# Patient Record
Sex: FEMALE | Race: WHITE
[De-identification: names, ages, dates, MRNs, and addresses within clinical notes are randomized per-mention and may not be internally consistent; named-entity substitution may affect disease eponyms.]

---

## 2017-09-14 NOTE — EDM.PDOC
ED HPI GENERAL MEDICAL PROBLEM





- General


Chief Complaint: Respiratory Problem


Stated Complaint: SORE THROAT


Time Seen by Provider: 09/14/17 18:27


Source of Information: Reports: Patient


History Limitations: Reports: No Limitations





- History of Present Illness


INITIAL COMMENTS - FREE TEXT/NARRATIVE: 





PT STATES SHE DEVELOPED COUGH WITH YELLOW SPUTUM AND CHEST CONGESTION 2 DAYS 

AGO AND BECOMING WORSE. FEELS LIKE SHE HAS HAD FEVER ON/OFF. DENIES PREGNANCY, N

/V, SOB, CP.


Onset: Gradual


Onset Date: 09/12/17


Severity: Mild


Associated Symptoms: Reports: Cough, Fever/Chills





- Related Data


 Allergies











Allergy/AdvReac Type Severity Reaction Status Date / Time


 


amoxicillin Allergy  Rash Verified 09/14/17 18:54


 


hydrocodone Allergy  Rash Verified 09/14/17 18:54


 


Sulfa (Sulfonamide Allergy  Cannot Verified 09/14/17 18:54





Antibiotics)   Remember  











Home Meds: 


 Home Meds





Albuterol [Proventil HFA] 6.7 gm INH Q4H #1 inhaler 09/14/17 [Rx]


Azithromycin [Zithromax] 500 mg PO DAILY #6 tablet 09/14/17 [Rx]











ED ROS GENERAL





- Review of Systems


Review Of Systems: ROS reveals no pertinent complaints other than HPI.


Constitutional: Reports: Fever


HEENT: Reports: Throat Pain


Respiratory: Reports: Wheezing, Cough, Sputum


Cardiovascular: Reports: No Symptoms


Endocrine: Reports: No Symptoms


GI/Abdominal: Reports: No Symptoms


: Reports: No Symptoms


Musculoskeletal: Reports: No Symptoms


Skin: Reports: No Symptoms


Neurological: Reports: No Symptoms


Psychiatric: Reports: No Symptoms


Hematologic/Lymphatic: Reports: No Symptoms


Immunologic: Reports: No Symptoms





ED EXAM, GENERAL





- Physical Exam


Exam: See Below


Exam Limited By: No Limitations


General Appearance: Alert, WD/WN, No Apparent Distress


Eye Exam: Bilateral Eye: Normal Inspection


Ears: Normal External Exam, Normal Canal, Normal TMs


Nose: Normal Inspection, Normal Mucosa, No Blood


Throat/Mouth: Normal Inspection, Normal Oropharynx, No Airway Compromise


Head: Atraumatic, Normocephalic


Neck: Normal Inspection, Supple.  No: Lymphadenopathy (L), Lymphadenopathy (R)


Respiratory/Chest: No Respiratory Distress, Rhonchi (APICALLY / CLEARS WITH 

COUGH)


Cardiovascular: Regular Rate, Rhythm, No Murmur


GI/Abdominal: Normal Bowel Sounds, Soft, Non-Tender


Extremities: Normal Inspection, No Pedal Edema


Neurological: Alert, Oriented, Normal Cognition


Psychiatric: Normal Affect, Normal Mood


Skin Exam: Warm, Dry, Intact, Normal Color, No Rash


Lymphatic: No Adenopathy





Course





- Re-Assessments/Exams


Free Text/Narrative Re-Assessment/Exam: 





09/14/17 18:48


PT AFEBRILE, NONTOXIC APPEARING, VSS, ROCEPHIN AND ALB/ATRO NEB GIVEN





Departure





- Departure


Time of Disposition: 18:49


Disposition: Home, Self-Care 01


Condition: Good


Clinical Impression: 


 Bronchitis








- Discharge Information


Instructions:  Acute Bronchitis, Easy-to-Read


Referrals: 


PCP,Not In Area [Primary Care Provider] - 


Forms:  ED Department Discharge


Additional Instructions: 


FOLLOW UP WITH PCP IN NEXT 2-3 DAYS. RETURN TO ER SOONER IF SYMPTOMS CONTINUE





- Assessment/Plan


Assessment:: 





BRONCHITIS


Plan: 





FOLLOW UP AT CLINIC IN NEXT 2-3 DAYS

## 2018-03-10 NOTE — EDM.PDOC
ED HPI GENERAL MEDICAL PROBLEM





- General


Chief Complaint: General


Stated Complaint: HEARTBURN,CHEST PAIN


Time Seen by Provider: 03/10/18 12:21


Source of Information: Reports: Patient


History Limitations: Reports: No Limitations





- History of Present Illness


INITIAL COMMENTS - FREE TEXT/NARRATIVE: 





Patient is a 37-year-old female who presents to the emergency department this 

afternoon with a complaint of acid reflux and chest pain.  Patient states it's 

been going on for several weeks and she is been taking Tums for mild relief.  

Patient states that this is worse when she is lying flat and after eating.  

Patient denies pain associated with activity, fever, family history of early 

cardiac issues, nausea, vomiting, abdominal pain, blood in stool, or 

possibility of pregnancy.


Onset: Gradual


Duration: Week(s):


Location: Reports: Chest, Abdomen


Quality: Reports: Burning


Severity: Mild


Improves with: Reports: Medication


Worsens with: Reports: Eating, Other (Lying flat)


Context: Denies: Activity, Exercise, Lifting, Sick Contact, Trauma


Associated Symptoms: Reports: No Other Symptoms


Treatments PTA: Reports: Other Medication(s) (tums)





- Related Data


 Allergies











Allergy/AdvReac Type Severity Reaction Status Date / Time


 


amoxicillin Allergy  Rash Verified 03/10/18 12:32


 


hydrocodone Allergy  Rash Verified 03/10/18 12:32


 


Sulfa (Sulfonamide Allergy  Cannot Verified 03/10/18 12:32





Antibiotics)   Remember  











Home Meds: 


 Home Meds





Albuterol [Proventil HFA] 6.7 gm INH Q4H #1 inhaler 09/14/17 [Rx]


Ranitidine HCl [Zantac] 150 mg PO DAILY #30 tablet 03/10/18 [Rx]











Social & Family History





- Tobacco Use


Smoking Status *Q: Current Every Day Smoker


Years of Tobacco use: 25


Packs/Tins Daily: 0.5


Second Hand Smoke Exposure: Yes





- Recreational Drug Use


Recreational Drug Use: No





ED ROS GENERAL





- Review of Systems


Review Of Systems: ROS reveals no pertinent complaints other than HPI.


Constitutional: Reports: No Symptoms


HEENT: Reports: No Symptoms, Other (Burning sensation in throat after eating, 

and while lying flat)


Respiratory: Reports: No Symptoms


Cardiovascular: Reports: No Symptoms.  Denies: Dyspnea on Exertion, Palpitations

, Syncope


Endocrine: Reports: No Symptoms


GI/Abdominal: Denies: Abdominal Pain (Epigastric at times), Black Stool, Bloody 

Stool, Nausea, Vomiting


: Reports: No Symptoms


Musculoskeletal: Reports: No Symptoms


Skin: Reports: No Symptoms


Neurological: Reports: No Symptoms


Psychiatric: Reports: No Symptoms


Hematologic/Lymphatic: Reports: No Symptoms


Immunologic: Reports: No Symptoms





ED EXAM, GI/ABD





- Physical Exam


Exam: See Below


Exam Limited By: No Limitations


General Appearance: Alert, WD/WN, No Apparent Distress


Nose: Normal Inspection, Normal Mucosa, No Blood


Throat/Mouth: Normal Inspection, Normal Oropharynx, No Airway Compromise


Head: Atraumatic, Normocephalic


Neck: Normal Inspection, Supple


Respiratory/Chest: No Respiratory Distress, Lungs Clear, Normal Breath Sounds, 

Chest Non-Tender


Cardiovascular: Regular Rate, Rhythm, No Murmur


GI/Abdominal Exam: Normal Bowel Sounds, Soft, Non-Tender, No Organomegaly, No 

Distention, No Abnormal Bruit, No Mass


Back Exam: Normal Inspection.  No: CVA Tenderness (L), CVA Tenderness (R)


Extremities: Normal Inspection, No Pedal Edema


Neurological: Alert, Oriented, Normal Cognition


Psychiatric: Normal Affect, Normal Mood


Skin Exam: Warm, Dry, Intact, Normal Color, No Rash


Lymphatic: No Adenopathy





EKG INTERPRETATION


EKG Date: 03/10/18


Time: 12:05


Rhythm: NSR


Rate (Beats/Min): 70


Axis: Normal


P-Wave: Present


QRS: Normal


ST-T: Normal


QT: Normal


Comparison: NA - No Prior EKG





Course





- Orders/Labs/Meds


Orders: 





 Active Orders 24 hr











 Category Date Time Status


 


 EKG Documentation Completion [RC] ASDIRECTED Care  03/10/18 12:22 Ordered


 


 GI Cocktail Med  03/10/18 12:21 Once





 45 ml PO ONETIME ONE   


 


 EKG 12 Lead [EK] Routine Ther  03/10/18 12:21 Ordered














- Re-Assessments/Exams


Free Text/Narrative Re-Assessment/Exam: 





03/10/18 12:30


Patient afebrile, nontoxic appearing, vital signs stable.  Patient given GI 

cocktail and symptoms subsided.  Patient will be given Zantac 150 over the 

counter instructions and follow-up with PCP in 2-3 days.





Departure





- Departure


Time of Disposition: 12:32


Disposition: Home, Self-Care 01


Condition: Good


Clinical Impression: 


GERD (gastroesophageal reflux disease)


Qualifiers:


 Esophagitis presence: esophagitis presence not specified Qualified Code(s): 

K21.9 - Gastro-esophageal reflux disease without esophagitis








- Discharge Information


Instructions:  Food Choices for Gastroesophageal Reflux Disease, Adult, Easy-to-

Read, Gastroesophageal Reflux Disease, Adult, Easy-to-Read


Referrals: 


Denia Cary MD [Primary Care Provider] - 


Forms:  ED Department Discharge


Additional Instructions: 


Follow-up with Dr. Pina in next 2-3 days.  Return to the emergency 

department sooner if symptoms continue or worsen.





- My Orders


Last 24 Hours: 





My Active Orders





03/10/18 12:21


GI Cocktail   45 ml PO ONETIME ONE 


EKG 12 Lead [EK] Routine 





03/10/18 12:22


EKG Documentation Completion [RC] ASDIRECTED 














- Assessment/Plan


Last 24 Hours: 





My Active Orders





03/10/18 12:21


GI Cocktail   45 ml PO ONETIME ONE 


EKG 12 Lead [EK] Routine 





03/10/18 12:22


EKG Documentation Completion [RC] ASDIRECTED 











Assessment:: 





GERD


Plan: 





Follow-up with PCP in 2-3 days

## 2018-05-15 ENCOUNTER — HOSPITAL ENCOUNTER (OUTPATIENT)
Dept: HOSPITAL 77 - KA.SDS | Age: 38
Discharge: HOME | End: 2018-05-15
Payer: MEDICAID

## 2018-05-15 VITALS — DIASTOLIC BLOOD PRESSURE: 65 MMHG | SYSTOLIC BLOOD PRESSURE: 119 MMHG

## 2018-05-15 DIAGNOSIS — K29.70: Primary | ICD-10-CM

## 2018-05-15 DIAGNOSIS — F17.200: ICD-10-CM

## 2018-05-15 DIAGNOSIS — Z88.2: ICD-10-CM

## 2018-05-15 DIAGNOSIS — Z98.51: ICD-10-CM

## 2018-05-15 DIAGNOSIS — Z88.5: ICD-10-CM

## 2018-05-15 DIAGNOSIS — R07.9: ICD-10-CM

## 2018-05-15 DIAGNOSIS — Z98.890: ICD-10-CM

## 2018-05-15 DIAGNOSIS — Z88.1: ICD-10-CM

## 2018-05-15 DIAGNOSIS — K21.0: ICD-10-CM

## 2018-05-15 NOTE — PROC
PROVIDER:  Grzegorz Ray MD

 

REFERRING PHYSICIAN:  GUERRERO Gastelum.

 

PRE-PROCEDURE DIAGNOSIS:  Gastroesophageal reflux disease.

 

POST-PROCEDURE DIAGNOSIS:  Gastritis.

 

PROCEDURE PERFORMED:  Esophagogastroduodenoscopy with biopsy.

 

INDICATIONS FOR SURGERY:  This 38-year-old female has been having symptoms of

burning pain in her chest and upper abdomen.  This has not been well controlled

with PPI agents, and she is referred for an upper endoscopy.

 

FINDINGS:  The patient's esophagus does not appear to be acutely inflamed or

otherwise abnormal.  In her stomach, there is a mild degree of inflammation in

the antrum near the pylorus, but no ulcers are seen.  Her duodenum and the

remainder of her stomach appeared normal.

 

PROCEDURE:  The patient was taken to the operating room.  She was given

intravenous sedation, and the esophagus was intubated under direct visualization

with the Olympus gastroscope.  This was carefully advanced under direct

visualization through the esophagus, stomach, and into the duodenum, where

examination to the fourth portion was performed.  After carefully examining the

duodenum, the scope was withdrawn back into the stomach where full examination

including a retroflexed examination of the fundus was carried out.  Random

biopsies of the antrum were taken to rule out H. pylori.  The GE junction was

then carefully examined.  This appeared relatively normal and did not show gross

evidence of severe inflammation, but biopsies of the GE junction were taken

because of the patient's symptoms.  The scope was then withdrawn and removed.

The patient was then taken from the operating room in a satisfactory condition.

 

ESTIMATED BLOOD LOSS:  3 mL.

 

COMPLICATIONS:  None.

 

PROGNOSIS:  Good.

 

DD:  05/15/2018 11:47:24

DT:  05/15/2018 22:05:43

Job #:  [id number]

## 2018-05-15 NOTE — PCM.PN
- General Info


Date of Service: 05/15/18





- Review of Systems


Systems Review Comment:: 


38-year-old female referred for upper endoscopy. She has a several year history 

of upper abdominal and chest pain. She describes this as a burning acid reflux 

type pain. She also states that acid sometimes comes up into her mouth. She has 

tried multiple medications in the past. Currently she is taking a generic form 

of omeprazole. I have reviewed the proposed upper endoscopy with the patient. 

She agrees to proceed accepting risks.








- Patient Data


Vitals - Most Recent: 


 Last Vital Signs











Temp  97.6 F   05/15/18 09:13


 


Pulse  70   05/15/18 09:13


 


Resp  16   05/15/18 09:13


 


BP  136/72   05/15/18 09:13


 


Pulse Ox  98   05/15/18 09:13











Weight - Most Recent: 78.925 kg


Med Orders - Current: 


 Current Medications





Lactated Ringer's (Ringers, Lactated)  1,000 mls @ 50 mls/hr IV ASDIRECTED ARACELY


   Last Admin: 05/15/18 09:13 Dose:  50 mls/hr


Sodium Chloride (Syrex Flush)  5 ml FLUSH Q8HR PRN


   PRN Reason: Keep Vein Open





Discontinued Medications





Fentanyl (Sublimaze) Confirm Administered Dose 100 mcg .ROUTE .STK-MED ONE


   Stop: 05/15/18 08:07


Midazolam HCl (Versed 1 Mg/Ml) Confirm Administered Dose 4 mg .ROUTE .STK-MED 

ONE


   Stop: 05/15/18 08:07


Propofol (Diprivan  20 Ml) Confirm Administered Dose 200 mg .ROUTE .STK-MED ONE


   Stop: 05/15/18 08:07











- Problem List Review


Problem List Initiated/Reviewed/Updated: Yes





- My Orders


Last 24 Hours: 


My Active Orders





05/15/18 09:00


Peripheral IV Care [RC] .AS DIRECTED 


HCG QUALITATIVE,URINE [URCHEM] Routine 


Lactated Ringers [Ringers, Lactated] 1,000 ml IV ASDIRECTED 


Sodium Chloride 0.9% [Syrex Flush]   5 ml FLUSH Q8HR PRN 


Peripheral IV Insertion Adult [OM.PC] Routine 





05/15/18 09:30


Patient to Empty Bladder [RC] ASDIRECTED 





05/15/18 09:45


Verify Patient Consent Obtain [RC] ASDIRECTED 





05/15/18 Breakfast


Nothing Per Oral Diet [DIET] 














- Assessment


Assessment:: 





Acid reflux symptoms





- Plan


Plan:: 





Upper endoscopy

## 2018-05-15 NOTE — PCM.OPNOTE
- General Post-Op/Procedure Note


Date of Surgery/Procedure: 05/15/18


Operative Procedure(s): EGD with biopsy


Findings: 





Mild Gastritis in lower stomach


Pre Op Diagnosis: GERD


Post-Op Diagnosis: Gastritis


Anesthesia Technique: MAC


Primary Surgeon: Grzegorz Ray


Pathology: 





Biopsies of Antrum and GE Jct


Output, Urine Amount: 0


EBL in mLs: 3


Complications: None


Condition: Good

## 2020-08-24 NOTE — CR
______________________________________________________________________________   

  

[id number] RAD/RAD Chest PA And Lateral  

EXAM: FRONTAL AND LATERAL CHEST  

   

 INDICATION: RIGHT UPPER EPIGASTRIC PAIN.  

   

 COMPARISON: None.  

   

 DISCUSSION: The lungs are borderline hyperinflated, but clear. The heart is  

 normal in size.  

   

 IMPRESSION:    

 1.  Negative exam.  

   

 Electronically signed by Julian Vivar MD on 8/24/2020 9:22 AM  

   

  

Julian Vivar MD                 

 08/24/20 0989    

  

Thank you for allowing us to participate in the care of your patient.

## 2020-08-24 NOTE — EDM.PDOC
ED HPI GENERAL MEDICAL PROBLEM





- General


Chief Complaint: General


Stated Complaint: VERY HOT, DIZZY


Time Seen by Provider: 08/24/20 18:47


Source of Information: Reports: Patient





- History of Present Illness


INITIAL COMMENTS - FREE TEXT/NARRATIVE: 





Was seen in the emergency department this morning with a complete cardiac work-

up as well as electrolytes showing a concentration component likely dehydration.


Received 1 L of fluid felt better and was discharged home with instructions for 

follow-up this coming Thursday, 27 August 2020.





Rested at home sipping fluids drinking water, Gatorade, and also chicken noodle 

soup.


This evening experienced another episode of sweating with dizziness.  She drove 

herself to the emergency department for evaluation and has had 2 or 3 smaller 

episodes since then.





When discussing components and her menstrual irregularities over the past 6 

years since a endometrial scraping, she states "I was sweating in places I did 

not know I had".








Onset: Today





- Related Data


                                    Allergies











Allergy/AdvReac Type Severity Reaction Status Date / Time


 


amoxicillin Allergy  Rash Verified 08/24/20 18:31


 


hydrocodone Allergy  Rash Verified 08/24/20 18:31


 


Sulfa (Sulfonamide Allergy  Cannot Verified 08/24/20 18:31





Antibiotics)   Remember  











Home Meds: 


                                    Home Meds





Acetaminophen/Diphenhydramine [Tylenol Pm Ex-Strength Caplet] 1 each PO BEDTIME 

08/24/20 [History]


Doxylamine Succinate [Unisom Sleep Aid] 25 mg PO BEDTIME PRN 08/24/20 [History]











Past Medical History


HEENT History: Reports: Impaired Vision


Cardiovascular History: Reports: High Cholesterol


Respiratory History: Reports: Bronchitis, Recurrent


Gastrointestinal History: Reports: Bowel Obstruction, Other (See Below)


Other Gastrointestinal History: Heartburn.


OB/GYN History: Reports: Endometrial Ablation, Pregnancy, Other (See Below)


Other OB/GYN History: Tubes lasered.


Endocrine/Metabolic History: Reports: None


Immunologic History: Reports: None


Oncologic (Cancer) History: Reports: Cervix





- Infectious Disease History


Infectious Disease History: Reports: Chicken Pox





- Past Surgical History


HEENT Surgical History: Reports: Adenoidectomy, Tonsillectomy


GI Surgical History: Reports: Appendectomy, Colon, Colonoscopy


Musculoskeletal Surgical History: Reports: Shoulder Surgery





- Past Imaging History


Past Imaging History: Reports: Ultrasound, Xray





Social & Family History





- Family History


Family Medical History: Noncontributory





- Tobacco Use


Smoking Status *Q: Current Every Day Smoker


Tobacco Use Within Last Twelve Months: Cigarettes


Packs/Tins Daily: 0.5


Smoking Cessation Information Provided To Patient: No





- Caffeine Use


Caffeine Use: Reports: Coffee, Soda





- Alcohol Use


Alcohol Use History: Yes


Days Per Week of Alcohol Use: 2


Alcohol Use in Last Twelve Months: Yes


Alcohol Use Frequency: Socially





ED ROS GENERAL





- Review of Systems


Review Of Systems: Comprehensive ROS is negative, except as noted in HPI.





ED EXAM, GENERAL





- Physical Exam


Exam: See Below


Free Text/Narrative:: 





Alert oriented x3 in no acute distress.


HEENT is negative discharge or deformity.


PERRLA no icterus no injection extraocular motion is intact.


There is no involvement of the auditory canals or tympanic membranes.


Pink moist mucous membranes with no erythema nor exudate.


Neck is soft supple no lymphadenopathy, no nuchal rigidity, no carotid bruit 

auscultated.


Thorax is clear throughout with no wheezes nor crackles noted.


Cardiac is S1-S2 I do not appreciate any murmur.


Abdomen is soft bowel sounds are present no tenderness is noted, no 

hepatosplenomegaly.


 rectal is deferred


She moves her extremities about with no discomfort radial pulse correlates with 

apical heart rate.


There is no edema to the lower extremities.





She was initially using her phone as I entered the room has remained totally anders

ropriate and is uncertain when questioned about hormonal perimenopausal stating 

she has not had regular menstrual cycle for 6 years since she had "endometrial 

scraping of her uterus."


General Appearance: Alert, WD/WN





Course





- Vital Signs


Last Recorded V/S: 


                                Last Vital Signs











Temp  36.8 C   08/24/20 18:32


 


Pulse  77   08/24/20 19:17


 


Resp  20   08/24/20 19:17


 


BP  153/96 H  08/24/20 19:17


 


Pulse Ox  96   08/24/20 19:17














- Orders/Labs/Meds


Orders: 


                               Active Orders 24 hr











 Category Date Time Status


 


 Peripheral IV Care [RC] .AS DIRECTED Care  08/24/20 18:39 Active


 


 Sodium Chloride 0.9% [Saline Flush] Med  08/24/20 18:39 Active





 10 ml FLUSH Q8HR PRN   


 


 Peripheral IV Insertion Adult [OM.PC] Routine Oth  08/24/20 18:39 Ordered








                                Medication Orders





Sodium Chloride (Saline Flush)  10 ml FLUSH Q8HR PRN


   PRN Reason: keep vein open


   Last Admin: 08/24/20 18:52  Dose: 10 ml


   Documented by: JOSÉ MIGUEL








Labs: 


                                Laboratory Tests











  08/24/20 08/24/20 08/24/20 Range/Units





  19:40 19:40 19:45 


 


WBC   10.79 H   (5.00-10.00)  10^3/uL


 


RBC   4.54   (3.80-5.50)  10^6/uL


 


Hgb   14.9  D   (12.0-16.0)  g/dL


 


Hct   43.6   (37.0-47.0)  %


 


MCV   96.0 H   (82.0-92.0)  fL


 


MCH   32.8 H   (27.0-31.0)  pg


 


MCHC   34.2   (32.0-36.0)  g/dL


 


RDW   13.2   (11.5-14.5)  %


 


Plt Count   189   (150-400)  10^3/uL


 


MPV   10.0   (7.4-10.4)  fL


 


Immature Gran % (Auto)   0.1   (0.0-5.0)  %


 


Neut % (Auto)   62.0   (50.0-70.0)  %


 


Lymph % (Auto)   30.2   (20.0-40.0)  %


 


Mono % (Auto)   6.9   (2.0-8.0)  %


 


Eos % (Auto)   0.4 L   (1.0-3.0)  %


 


Baso % (Auto)   0.4   (0.0-1.0)  %


 


Neut # (Auto)   6.70   (2.50-7.00)  10^3/uL


 


Lymph # (Auto)   3.26   (1.00-4.00)  10^3/uL


 


Mono # (Auto)   0.74   (0.10-0.80)  10^3/uL


 


Eos # (Auto)   0.04 L   (0.10-0.30)  10^3/uL


 


Baso # (Auto)   0.04   (0.00-0.10)  10^3/uL


 


Immature Gran # (Auto)   0.01   (0.00-0.50)  10^3/uL


 


Troponin I  0.07    (0.00-0.070)  ng/mL


 


TSH, Ultra Sensitive  2.720    (0.340-4.820)  uIU/mL


 


Specimen Type    Urincc  


 


Urine Color    Light yellow  (YELLOW)  


 


Urine Appearance    Clear  (CLEAR)  


 


Urine pH    7.0  (5.0-9.0)  


 


Ur Specific Gravity    1.015  (1.005-1.030)  


 


Urine Protein    Negative  (NEGATIVE)  mg/dL


 


Urine Glucose (UA)    Negative  (NEGATIVE)  mg/dL


 


Urine Ketones    Negative  (NEGATIVE)  mg/dL


 


Urine Occult Blood    Negative  (NEGATIVE)  


 


Urine Nitrite    Negative  (NEGATIVE)  


 


Urine Bilirubin    Negative  (NEGATIVE)  


 


Urine Urobilinogen    0.2  (0.2-1.0)  E.U./dL


 


Ur Leukocyte Esterase    Negative  (NEGATIVE)  











Meds: 


Medications











Generic Name Dose Route Start Last Admin





  Trade Name Freq  PRN Reason Stop Dose Admin


 


Sodium Chloride  10 ml  08/24/20 18:39  08/24/20 18:52





  Saline Flush  FLUSH   10 ml





  Q8HR PRN   Administration





  keep vein open  














Discontinued Medications














Generic Name Dose Route Start Last Admin





  Trade Name Freq  PRN Reason Stop Dose Admin


 


Sodium Chloride  1,000 mls @ 999 mls/hr  08/24/20 18:39  08/24/20 18:47





  Normal Saline  IV  08/24/20 19:39  999 mls/hr





  .BOLUS ONE   Administration


 


Lorazepam  0.5 mg  08/24/20 20:44 





  Ativan  PO  08/24/20 20:45 





  ONETIME ONE  














Departure





- Departure


Time of Disposition: 20:50


Disposition: Home, Self-Care 01


Condition: Good


Clinical Impression: 


 Hot flashes, Dizziness and giddiness, [place]landa-menopause








- Discharge Information


*PRESCRIPTION DRUG MONITORING PROGRAM REVIEWED*: Not Applicable


*COPY OF PRESCRIPTION DRUG MONITORING REPORT IN PATIENT RODRIGO: Not Applicable


Referrals: 


Evelia Devi PA-C [Primary Care Provider] - 


Forms:  ED Department Discharge


Additional Instructions: 


I BELIEVE YOU ARE EXPERIENCING "HOT FLASHES"   Perimenopausal. 





Your labs are all negative or showing improvement from this am.  The extra fluid

you have received by IV or by drinking will make up for the sweating/hor 

flashes.





Call the clinic in the am to see Evelia as soon as possible to complete hormonal 

testing and possible treatment options.





Sepsis Event Note (ED)





- Evaluation


Sepsis Screening Result: No Definite Risk





- Focused Exam


Vital Signs: 


                                   Vital Signs











  Temp Pulse Resp BP Pulse Ox


 


 08/24/20 19:17   77  20  153/96 H  96


 


 08/24/20 18:54   84  18  130/76  97


 


 08/24/20 18:32  36.8 C  88  18  151/95 H  97














- Problem List & Annotations


(1) Hot flashes


SNOMED Code(s): 690963620


   Code(s): R23.2 - FLUSHING   Status: Acute   Priority: Medium   Current Visit:

Yes   





(2) Dizziness and giddiness


SNOMED Code(s): 483070399


   Code(s): R42 - DIZZINESS AND GIDDINESS   Status: Acute   Priority: Medium   

Current Visit: Yes   





(3) [place]a-menopause


SNOMED Code(s): 116171993534739


   Code(s): N95.1 - MENOPAUSAL AND FEMALE CLIMACTERIC STATES   Status: Acute   

Current Visit: Yes   





- Problem List Review


Problem List Initiated/Reviewed/Updated: Yes





- My Orders


Last 24 Hours: 


My Active Orders





08/24/20 18:39


Peripheral IV Care [RC] .AS DIRECTED 


Sodium Chloride 0.9% [Saline Flush]   10 ml FLUSH Q8HR PRN 


Peripheral IV Insertion Adult [OM.PC] Routine 














- Assessment/Plan


Last 24 Hours: 


My Active Orders





08/24/20 18:39


Peripheral IV Care [RC] .AS DIRECTED 


Sodium Chloride 0.9% [Saline Flush]   10 ml FLUSH Q8HR PRN 


Peripheral IV Insertion Adult [OM.PC] Routine 











Plan: 





I BELIEVE YOU ARE EXPERIENCING "HOT FLASHES"   Perimenopausal. 





Your labs are all negative or showing improvement from this am.  The extra fluid

 you have received by IV or by drinking will make up for the sweating/hor 

flashes.





Call the clinic in the am to see Evelia as soon as possible to complete hormonal 

testing and possible treatment options.

## 2020-08-24 NOTE — EDM.PDOC
ED HPI GENERAL MEDICAL PROBLEM





- General


Chief Complaint: General


Stated Complaint: dizzy, light headed, shakey


Time Seen by Provider: 08/24/20 08:57


Source of Information: Reports: Patient


History Limitations: Reports: No Limitations





- History of Present Illness


INITIAL COMMENTS - FREE TEXT/NARRATIVE: 





41 YO WF PRESENTS TO ER COMPLAINING OF DIZZINESS AND NEAR SYNCOPE WHICH OCCURRED

PRIOR TO ARRIVAL. PT REPORTS SHE WAS AT WORK AND BECAME DIZZY. PT REPORTS 

SITTING DOWN AND CONTINUED TO FEEL WEAK BUT DENIES LOSS OF CONSCIOUSNESS. PT 

REPORTS ASSOCIATED DIAPHORESIS AND NAUSEA. PT DENIES CHEST PAIN OR SHORTNESS OF 

BREATH. PT REPORTS SHE HAD COFFEE, EXCEDRIN AND SODA POP BUT NOTHING TO EAT THIS

AM. PT REPORTS SHE'S HAD EPISODES OF THIS IN THE PAST WITH ASSOCIATED ARM 

NUMBNESS WHICH HAS BEEN TRANSIENT IN NATURE. PT DENIES FEVER/CHILLS, NO 

COUGH/CONGESTION AND NO SICK CONTACTS. 


Onset: Today


Duration: Improving, Resolved Prior to Arrival


Location: Reports: Generalized


Severity: Mild


Improves with: Reports: Rest


Worsens with: Reports: None


Associated Symptoms: Reports: Diaphoresis, Loss of Appetite, Nausea/Vomiting.  

Denies: Confusion, Chest Pain, Cough, cough w sputum, Fever/Chills, Headaches, S

hortness of Breath


  ** Right Epigastric


Pain Score (Numeric/FACES): 6





- Related Data


                                    Allergies











Allergy/AdvReac Type Severity Reaction Status Date / Time


 


amoxicillin Allergy  Rash Verified 05/15/18 08:57


 


hydrocodone Allergy  Rash Verified 05/15/18 08:57


 


Sulfa (Sulfonamide Allergy  Cannot Verified 05/15/18 08:57





Antibiotics)   Remember  











Home Meds: 


                                    Home Meds





RX: Omeprazole 40 mg PO BIDAC 05/09/18 [History]


RX: atorvaSTATin [Lipitor] 5 mg PO DAILY 05/15/18 [History]


RX: traMADol [Ultram] 50 mg PO DAILY 05/15/18 [History]











Past Medical History


HEENT History: Reports: Impaired Vision


Cardiovascular History: Reports: High Cholesterol


Respiratory History: Reports: Bronchitis, Recurrent


Gastrointestinal History: Reports: Bowel Obstruction, Other (See Below)


Other Gastrointestinal History: Heartburn.


OB/GYN History: Reports: Endometrial Ablation, Pregnancy, Other (See Below)


Other OB/GYN History: Tubes lasered.


Oncologic (Cancer) History: Reports: Cervix





- Infectious Disease History


Infectious Disease History: Reports: Chicken Pox





- Past Surgical History


HEENT Surgical History: Reports: Adenoidectomy, Tonsillectomy


GI Surgical History: Reports: Appendectomy, Colon, Colonoscopy


Musculoskeletal Surgical History: Reports: Shoulder Surgery





Social & Family History





- Family History


Family Medical History: Noncontributory





- Tobacco Use


Smoking Status *Q: Current Every Day Smoker


Years of Tobacco use: 20


Packs/Tins Daily: 0.5





- Caffeine Use


Caffeine Use: Reports: Coffee, Soda





- Alcohol Use


Days Per Week of Alcohol Use: 1


Number of Drinks Per Day: 8


Total Drinks Per Week: 8





- Recreational Drug Use


Recreational Drug Use: No





ED ROS GENERAL





- Review of Systems


Review Of Systems: See Below


Constitutional: Reports: Malaise


HEENT: Reports: No Symptoms


Respiratory: Reports: No Symptoms


Cardiovascular: Reports: Lightheadedness


Endocrine: Reports: No Symptoms


GI/Abdominal: Reports: Nausea


: Reports: No Symptoms


Musculoskeletal: Reports: No Symptoms


Skin: Reports: No Symptoms


Neurological: Reports: Dizziness


Psychiatric: Reports: No Symptoms


Hematologic/Lymphatic: Reports: No Symptoms


Immunologic: Reports: No Symptoms





ED EXAM, GENERAL





- Physical Exam


Exam: See Below


Exam Limited By: No Limitations


General Appearance: Alert, WD/WN, No Apparent Distress


Eye Exam: Bilateral Eye: PERRL


Head: Atraumatic, Normocephalic


Neck: Normal Inspection, Supple, Non-Tender, Full Range of Motion


Respiratory/Chest: No Respiratory Distress, Lungs Clear, Normal Breath Sounds, 

No Accessory Muscle Use, Chest Non-Tender


Cardiovascular: Normal Peripheral Pulses, Regular Rate, Rhythm, No Edema, No Ga

llop, No JVD, No Murmur, No Rub


GI/Abdominal: Normal Bowel Sounds, Soft, Non-Tender, No Organomegaly, No 

Distention, No Abnormal Bruit, No Mass


Back Exam: Normal Inspection, Full Range of Motion, NT


Extremities: Normal Inspection, Normal Range of Motion, Non-Tender, Normal 

Capillary Refill, No Pedal Edema


Neurological: Alert, Oriented, CN II-XII Intact, Normal Cognition, Normal Gait, 

Normal Reflexes, No Motor/Sensory Deficits


Psychiatric: Normal Affect, Normal Mood


Skin Exam: Warm, Dry, Intact, Normal Color, No Rash


Lymphatic: No Adenopathy





EKG INTERPRETATION


EKG Date: 08/24/20


Time: 09:00


Rhythm: NSR


Rate (Beats/Min): 75


Axis: Normal


P-Wave: Present


QRS: RBBB


ST-T: Normal


QT: Normal





Course





- Vital Signs


Last Recorded V/S: 


                                Last Vital Signs











Temp  36.3 C   08/24/20 08:17


 


Pulse  70   08/24/20 09:45


 


Resp  16   08/24/20 09:45


 


BP  149/90 H  08/24/20 09:45


 


Pulse Ox  98   08/24/20 09:45








                                        





Orthostatic Blood Pressure [     146/102


Standing]                        


Orthostatic Blood Pressure [     161/96


Sitting]                         











- Orders/Labs/Meds


Orders: 


                               Active Orders 24 hr











 Category Date Time Status


 


 EKG Documentation Completion [RC] ASDIRECTED Care  08/24/20 08:37 Active


 


 Peripheral IV Care [RC] .AS DIRECTED Care  08/24/20 08:36 Active


 


 Sodium Chloride 0.9% [Saline Flush] Med  08/24/20 08:36 Active





 10 ml FLUSH Q8HR PRN   


 


 Peripheral IV Insertion Adult [OM.PC] Routine Oth  08/24/20 08:36 Ordered


 


 EKG 12 Lead [EK] Stat Ther  08/24/20 08:36 Ordered








                                Medication Orders





Sodium Chloride (Saline Flush)  10 ml FLUSH Q8HR PRN


   PRN Reason: keep vein open


   Last Admin: 08/24/20 09:12  Dose: 10 ml


   Documented by: JOSÉ MIGUEL








Labs: 


                                Laboratory Tests











  08/24/20 08/24/20 Range/Units





  08:36 08:45 


 


WBC   12.07 H  (5.00-10.00)  10^3/uL


 


RBC   5.28  (3.80-5.50)  10^6/uL


 


Hgb   17.0 H  (12.0-16.0)  g/dL


 


Hct   50.4 H  (37.0-47.0)  %


 


MCV   95.5 H  (82.0-92.0)  fL


 


MCH   32.2 H  (27.0-31.0)  pg


 


MCHC   33.7  (32.0-36.0)  g/dL


 


RDW   13.2  (11.5-14.5)  %


 


Plt Count   213  (150-400)  10^3/uL


 


MPV   11.5 H  (7.4-10.4)  fL


 


Immature Gran % (Auto)   0.2  (0.0-5.0)  %


 


Neut % (Auto)   70.1 H  (50.0-70.0)  %


 


Lymph % (Auto)   23.2  (20.0-40.0)  %


 


Mono % (Auto)   5.3  (2.0-8.0)  %


 


Eos % (Auto)   0.7 L  (1.0-3.0)  %


 


Baso % (Auto)   0.5  (0.0-1.0)  %


 


Neut # (Auto)   8.46 H  (2.50-7.00)  10^3/uL


 


Lymph # (Auto)   2.80  (1.00-4.00)  10^3/uL


 


Mono # (Auto)   0.64  (0.10-0.80)  10^3/uL


 


Eos # (Auto)   0.08 L  (0.10-0.30)  10^3/uL


 


Baso # (Auto)   0.06  (0.00-0.10)  10^3/uL


 


Immature Gran # (Auto)   0.03  (0.00-0.50)  10^3/uL


 


Sodium  143   (136-145)  mmol/L


 


Potassium  4.3   (3.3-5.3)  mmol/L


 


Chloride  106   ()  mmol/L


 


Carbon Dioxide  27.2   (21.0-32.0)  mmol/L


 


Anion Gap  14.1   (5-15)  mmol/L


 


BUN  7   (6-25)  mg/dL


 


Creatinine  0.80   (0.51-1.17)  mg/dL


 


Est Cr Clr Drug Dosing  81.60   mL/min


 


Estimated GFR (MDRD)  > 60   mL/min


 


Glucose  89  (75 - 99) mg/dL


 


Calcium  8.9   (8.7-10.3)  mg/dL


 


Total Bilirubin  0.8   (0.2-1.0)  mg/dL


 


AST  22   (15-37)  U/L


 


ALT  41   (12-78)  U/L


 


Alkaline Phosphatase  90   ()  IU/L


 


Creatine Kinase  81   ()  U/L


 


CK-MB (CK-2)  1.30   (0.00-4.30)  ng/mL


 


Troponin I  0.06   (0.00-0.070)  ng/mL


 


Total Protein  7.3   (6.4-8.2)  g/dL


 


Albumin  4.02   (3.00-4.80)  g/dL


 


Lipase  123   ()  U/L











Meds: 


Medications











Generic Name Dose Route Start Last Admin





  Trade Name Freq  PRN Reason Stop Dose Admin


 


Sodium Chloride  10 ml  08/24/20 08:36  08/24/20 09:12





  Saline Flush  FLUSH   10 ml





  Q8HR PRN   Administration





  keep vein open  














Discontinued Medications














Generic Name Dose Route Start Last Admin





  Trade Name Freq  PRN Reason Stop Dose Admin


 


Sodium Chloride  1,000 mls @ 999 mls/hr  08/24/20 08:36  08/24/20 09:12





  Normal Saline  IV  08/24/20 09:36  999 mls/hr





  .BOLUS ONE   Administration














- Radiology Interpretation


Free Text/Narrative:: 





CXR-NAD





Departure





- Departure


Time of Disposition: 10:07


Disposition: Home, Self-Care 01


Condition: Good


Clinical Impression: 


 Dehydration








- Discharge Information


Instructions:  Dehydration, Adult, Easy-to-Read


Referrals: 


Evelia Devi PA-C [Primary Care Provider] - 


Forms:  ED Department Discharge


Additional Instructions: 


1. DISCHARGE HOME


2. INCREASE FLUIDS


3. DECREASE CAFFEINE CONSUMPTION


4. FOLLOW UP WITH PCP THIS WEEK FOR FURTHER EVALUATION


5. RETURN TO ER FOR WORSENING SYMPTOMS 





Sepsis Event Note (ED)





- Evaluation


Sepsis Screening Result: No Definite Risk





- Focused Exam


Vital Signs: 


                                   Vital Signs











  Temp Pulse Resp BP Pulse Ox


 


 08/24/20 09:45   70  16  149/90 H  98


 


 08/24/20 09:30   78  24 H  148/83 H  98


 


 08/24/20 09:15   78  14  136/86  97


 


 08/24/20 09:10   84   159/97 H 


 


 08/24/20 08:30   84  22 H  144/83 H  98


 


 08/24/20 08:17  36.3 C  98  18  156/98 H  98














- My Orders


Last 24 Hours: 


My Active Orders





08/24/20 08:36


Peripheral IV Care [RC] .AS DIRECTED 


Sodium Chloride 0.9% [Saline Flush]   10 ml FLUSH Q8HR PRN 


Peripheral IV Insertion Adult [OM.PC] Routine 


EKG 12 Lead [EK] Stat 





08/24/20 08:37


EKG Documentation Completion [RC] ASDIRECTED 














- Assessment/Plan


Last 24 Hours: 


My Active Orders





08/24/20 08:36


Peripheral IV Care [RC] .AS DIRECTED 


Sodium Chloride 0.9% [Saline Flush]   10 ml FLUSH Q8HR PRN 


Peripheral IV Insertion Adult [OM.PC] Routine 


EKG 12 Lead [EK] Stat 





08/24/20 08:37


EKG Documentation Completion [RC] ASDIRECTED 











Assessment:: 





1. DEHYDRATION





Plan: 





1. DISCHARGE HOME


2. INCREASE FLUIDS


3. DECREASE CAFFEINE CONSUMPTION


4. FOLLOW UP WITH PCP THIS WEEK FOR FURTHER EVALUATION


5. RETURN TO ER FOR WORSENING SYMPTOMS

## 2020-08-25 NOTE — EDM.PDOC
ED HPI GENERAL MEDICAL PROBLEM





- General


Chief Complaint: General


Stated Complaint: HBP,ALCOHOL DETOX?


Time Seen by Provider: 08/25/20 14:16


Source of Information: Reports: Patient





- History of Present Illness


INITIAL COMMENTS - FREE TEXT/NARRATIVE: 





Speaks of potential, according to her friend in [place], of needing alcohol 

detox.  Was drinking last week but during her visits yesterday to the emergency 

department as well as clinic today did not acknowledge any alcohol intake since 

the past Saturday 23 August.  This was not a topic of conversation or 

acknowledgment by her during her in-depth work-up x2 yesterday.





Dates that hot flash was significant after leaving the clinic for her evaluation

and medication for perimenopausal symptoms.


Onset: Today, Sudden


Duration: Minutes:


Location: Reports: Generalized


Quality: Reports: Same as Previous Episode


Severity: Moderate


Improves with: Reports: None


Worsens with: Reports: None


  ** Chest


Pain Score (Numeric/FACES): 2





- Related Data


                                    Allergies











Allergy/AdvReac Type Severity Reaction Status Date / Time


 


amoxicillin Allergy  Rash Verified 08/24/20 18:31


 


hydrocodone Allergy  Rash Verified 08/24/20 18:31


 


Sulfa (Sulfonamide Allergy  Cannot Verified 08/24/20 18:31





Antibiotics)   Remember  











Home Meds: 


                                    Home Meds





Acetaminophen/Diphenhydramine [Tylenol Pm Ex-Strength Caplet] 1 each PO BEDTIME 

08/24/20 [History]


Doxylamine Succinate [Unisom Sleep Aid] 25 mg PO BEDTIME PRN 08/24/20 [History]


LORazepam [Lorazepam] 0.5 mg PO TID 5 Days #15 tablet 08/25/20 [Rx]











Past Medical History


HEENT History: Reports: Impaired Vision


Cardiovascular History: Reports: High Cholesterol


Respiratory History: Reports: Bronchitis, Recurrent


Gastrointestinal History: Reports: Bowel Obstruction, Other (See Below)


Other Gastrointestinal History: Heartburn.


OB/GYN History: Reports: Endometrial Ablation, Pregnancy, Other (See Below)


Other OB/GYN History: Tubes lasered.


Endocrine/Metabolic History: Reports: None


Immunologic History: Reports: None


Oncologic (Cancer) History: Reports: Cervix





- Infectious Disease History


Infectious Disease History: Reports: Chicken Pox





- Past Surgical History


HEENT Surgical History: Reports: Adenoidectomy, Tonsillectomy


GI Surgical History: Reports: Appendectomy, Colon, Colonoscopy


Musculoskeletal Surgical History: Reports: Shoulder Surgery





- Past Imaging History


Past Imaging History: Reports: Ultrasound, Xray





Social & Family History





- Family History


Family Medical History: Noncontributory





- Tobacco Use


Smoking Status *Q: Current Every Day Smoker





- Caffeine Use


Caffeine Use: Reports: Coffee, Soda





ED ROS GENERAL





- Review of Systems


Review Of Systems: Comprehensive ROS is negative, except as noted in HPI.





ED EXAM, GENERAL





- Physical Exam


Exam: See Below


General Appearance: Alert, WD/WN, No Apparent Distress


Ears: Normal External Exam, Normal Canal, Hearing Grossly Normal, Normal TMs


Nose: Normal Inspection, Normal Mucosa


Throat/Mouth: Normal Inspection, Normal Lips


Head: Atraumatic, Normocephalic


Neck: Normal Inspection, Supple, Non-Tender, Full Range of Motion


Respiratory/Chest: No Respiratory Distress, Lungs Clear


Cardiovascular: Normal Peripheral Pulses, Regular Rate, Rhythm


GI/Abdominal: Soft


 (Female) Exam: Deferred


Rectal (Female) Exam: Deferred


Back Exam: Full Range of Motion


Neurological: Alert, Oriented, CN II-XII Intact, Normal Cognition, Normal Gait, 

No Motor/Sensory Deficits


Psychiatric: Normal Affect, Normal Mood


Skin Exam: Warm, Dry, Intact, Normal Color, No Rash


Lymphatic: No Adenopathy





Course





- Vital Signs


Last Recorded V/S: 


                                Last Vital Signs











Temp  36.4 C   08/25/20 14:06


 


Pulse  98   08/25/20 14:06


 


Resp  20   08/25/20 14:06


 


BP  149/93 H  08/25/20 14:06


 


Pulse Ox  98   08/25/20 14:06














Departure





- Departure


Time of Disposition: 14:35


Disposition: Home, Self-Care 01


Condition: Good


Clinical Impression: 


 [place]landa-menopause, Anxiety about health, Hot flashes








- Discharge Information


*PRESCRIPTION DRUG MONITORING PROGRAM REVIEWED*: Yes


*COPY OF PRESCRIPTION DRUG MONITORING REPORT IN PATIENT RODRIGO: Yes


Prescriptions: 


LORazepam [Lorazepam] 0.5 mg PO TID 5 Days #15 tablet


Referrals: 


Evelia Devi PA-C [Primary Care Provider] - 


Forms:  ED Department Discharge


Additional Instructions: 


Lorazepam 0.5 to be taken once 3 times daily for anxiety and help with hot 

flashes until you are rechecked with Evelia on Friday, 28 August 2020.





Make sure you maintain good fluid intake and take your medications as 

prescribed.





Medication you were given in the clinic today will take possibly 4 weeks to 

fully benefit your hot flashes.





Call clinic if concerns otherwise as scheduled for follow-up on Friday.





Sepsis Event Note (ED)





- Evaluation


Sepsis Screening Result: No Definite Risk





- Focused Exam


Vital Signs: 


                                   Vital Signs











  Temp Pulse Resp BP Pulse Ox


 


 08/25/20 14:06  36.4 C  98  20  149/93 H  98














- Problem List & Annotations


(1) Anxiety about health


SNOMED Code(s): 061584200


   Code(s): F41.8 - OTHER SPECIFIED ANXIETY DISORDERS   Status: Acute   

Priority: High   





(2) Hot flashes


SNOMED Code(s): 540146780


   Code(s): R23.2 - FLUSHING   Status: Acute   Priority: High   





(3) [place]-menopause


SNOMED Code(s): 071532100525786


   Code(s): N95.1 - MENOPAUSAL AND FEMALE CLIMACTERIC STATES   Status: Acute   

Priority: High   





- Problem List Review


Problem List Initiated/Reviewed/Updated: Yes





- Assessment/Plan


Plan: 





Lorazepam 0.5 to be taken once 3 times daily for anxiety and help with hot 

flashes until you are rechecked with Evelia on Friday, 28 August 2020.





Make sure you maintain good fluid intake and take your medications as 

prescribed.





Medication you were given in the clinic today will take possibly 4 weeks to 

fully benefit your hot flashes.





Call clinic if concerns otherwise as scheduled for follow-up on Friday.